# Patient Record
Sex: FEMALE | Race: WHITE | NOT HISPANIC OR LATINO | URBAN - METROPOLITAN AREA
[De-identification: names, ages, dates, MRNs, and addresses within clinical notes are randomized per-mention and may not be internally consistent; named-entity substitution may affect disease eponyms.]

---

## 2023-06-03 RX ORDER — OXYCODONE AND ACETAMINOPHEN 5; 325 MG/1; MG/1
1 TABLET ORAL EVERY 4 HOURS PRN
COMMUNITY

## 2023-06-03 RX ORDER — NITROFURANTOIN (MACROCRYSTALS) 100 MG/1
100 CAPSULE ORAL NIGHTLY
COMMUNITY

## 2023-06-03 RX ORDER — LANOLIN ALCOHOL/MO/W.PET/CERES
15000 CREAM (GRAM) TOPICAL DAILY
COMMUNITY

## 2023-06-03 RX ORDER — TRAMADOL HYDROCHLORIDE 50 MG/1
50 TABLET ORAL EVERY 6 HOURS PRN
COMMUNITY

## 2023-06-03 RX ORDER — BUTYROSPERMUM PARKII(SHEA BUTTER), SIMMONDSIA CHINENSIS (JOJOBA) SEED OIL, ALOE BARBADENSIS LEAF EXTRACT .01; 1; 3.5 G/100G; G/100G; G/100G
250 LIQUID TOPICAL NIGHTLY
COMMUNITY

## 2023-06-03 RX ORDER — FOLIC ACID 0.8 MG
1000 TABLET ORAL NIGHTLY
COMMUNITY

## 2023-06-03 RX ORDER — ESOMEPRAZOLE MAGNESIUM 40 MG/1
40 CAPSULE, DELAYED RELEASE ORAL DAILY
Status: ON HOLD | COMMUNITY
End: 2023-06-06 | Stop reason: CLARIF

## 2023-06-03 RX ORDER — LANOLIN ALCOHOL/MO/W.PET/CERES
2000 CREAM (GRAM) TOPICAL NIGHTLY
COMMUNITY

## 2023-06-03 RX ORDER — ERGOCALCIFEROL 1.25 MG/1
50000 CAPSULE ORAL
COMMUNITY

## 2023-06-06 ENCOUNTER — ANESTHESIA (OUTPATIENT)
Dept: SURGERY | Facility: HOSPITAL | Age: 53
End: 2023-06-06
Payer: COMMERCIAL

## 2023-06-06 ENCOUNTER — ANESTHESIA EVENT (OUTPATIENT)
Dept: SURGERY | Facility: HOSPITAL | Age: 53
End: 2023-06-06
Payer: COMMERCIAL

## 2023-06-06 ENCOUNTER — HOSPITAL ENCOUNTER (OUTPATIENT)
Facility: HOSPITAL | Age: 53
Discharge: HOME | End: 2023-06-06
Attending: UROLOGY | Admitting: UROLOGY
Payer: COMMERCIAL

## 2023-06-06 LAB
B-HCG UR QL: NEGATIVE
CTP QC/QA: YES

## 2023-06-06 PROCEDURE — D9220A PRA ANESTHESIA: Mod: ANES,,, | Performed by: ANESTHESIOLOGY

## 2023-06-06 PROCEDURE — 71000015 HC POSTOP RECOV 1ST HR: Performed by: UROLOGY

## 2023-06-06 PROCEDURE — 63600175 PHARM REV CODE 636 W HCPCS: Performed by: ANESTHESIOLOGY

## 2023-06-06 PROCEDURE — D9220A PRA ANESTHESIA: ICD-10-PCS | Mod: CRNA,,, | Performed by: NURSE ANESTHETIST, CERTIFIED REGISTERED

## 2023-06-06 PROCEDURE — 82962 GLUCOSE BLOOD TEST: CPT | Performed by: UROLOGY

## 2023-06-06 PROCEDURE — 36000707: Performed by: UROLOGY

## 2023-06-06 PROCEDURE — 37000009 HC ANESTHESIA EA ADD 15 MINS: Performed by: UROLOGY

## 2023-06-06 PROCEDURE — 25000003 PHARM REV CODE 250

## 2023-06-06 PROCEDURE — 25000003 PHARM REV CODE 250: Performed by: NURSE ANESTHETIST, CERTIFIED REGISTERED

## 2023-06-06 PROCEDURE — 71000033 HC RECOVERY, INTIAL HOUR: Performed by: UROLOGY

## 2023-06-06 PROCEDURE — D9220A PRA ANESTHESIA: Mod: CRNA,,, | Performed by: NURSE ANESTHETIST, CERTIFIED REGISTERED

## 2023-06-06 PROCEDURE — 63600175 PHARM REV CODE 636 W HCPCS: Performed by: NURSE ANESTHETIST, CERTIFIED REGISTERED

## 2023-06-06 PROCEDURE — 81025 URINE PREGNANCY TEST: CPT | Performed by: UROLOGY

## 2023-06-06 PROCEDURE — D9220A PRA ANESTHESIA: ICD-10-PCS | Mod: ANES,,, | Performed by: ANESTHESIOLOGY

## 2023-06-06 PROCEDURE — 63600175 PHARM REV CODE 636 W HCPCS

## 2023-06-06 PROCEDURE — 71000016 HC POSTOP RECOV ADDL HR: Performed by: UROLOGY

## 2023-06-06 PROCEDURE — 36000706: Performed by: UROLOGY

## 2023-06-06 PROCEDURE — 37000008 HC ANESTHESIA 1ST 15 MINUTES: Performed by: UROLOGY

## 2023-06-06 RX ORDER — FLUCONAZOLE 150 MG/1
150 TABLET ORAL
COMMUNITY
Start: 2023-05-26

## 2023-06-06 RX ORDER — MIDAZOLAM HYDROCHLORIDE 1 MG/ML
INJECTION INTRAMUSCULAR; INTRAVENOUS
Status: COMPLETED
Start: 2023-06-06 | End: 2023-06-06

## 2023-06-06 RX ORDER — SODIUM CHLORIDE, SODIUM GLUCONATE, SODIUM ACETATE, POTASSIUM CHLORIDE AND MAGNESIUM CHLORIDE 30; 37; 368; 526; 502 MG/100ML; MG/100ML; MG/100ML; MG/100ML; MG/100ML
INJECTION, SOLUTION INTRAVENOUS CONTINUOUS
Status: DISCONTINUED | OUTPATIENT
Start: 2023-06-06 | End: 2023-06-06 | Stop reason: HOSPADM

## 2023-06-06 RX ORDER — IBUPROFEN 800 MG/1
800 TABLET ORAL 3 TIMES DAILY
COMMUNITY
Start: 2023-05-29

## 2023-06-06 RX ORDER — LIDOCAINE HYDROCHLORIDE 10 MG/ML
1 INJECTION, SOLUTION EPIDURAL; INFILTRATION; INTRACAUDAL; PERINEURAL ONCE AS NEEDED
Status: DISCONTINUED | OUTPATIENT
Start: 2023-06-06 | End: 2023-06-06 | Stop reason: HOSPADM

## 2023-06-06 RX ORDER — ONDANSETRON HYDROCHLORIDE 2 MG/ML
INJECTION, SOLUTION INTRAMUSCULAR; INTRAVENOUS
Status: DISCONTINUED | OUTPATIENT
Start: 2023-06-06 | End: 2023-06-06

## 2023-06-06 RX ORDER — DEXAMETHASONE SODIUM PHOSPHATE 4 MG/ML
INJECTION, SOLUTION INTRA-ARTICULAR; INTRALESIONAL; INTRAMUSCULAR; INTRAVENOUS; SOFT TISSUE
Status: DISCONTINUED | OUTPATIENT
Start: 2023-06-06 | End: 2023-06-06

## 2023-06-06 RX ORDER — LIDOCAINE HYDROCHLORIDE 10 MG/ML
1 INJECTION, SOLUTION EPIDURAL; INFILTRATION; INTRACAUDAL; PERINEURAL ONCE
Status: DISCONTINUED | OUTPATIENT
Start: 2023-06-06 | End: 2023-06-06 | Stop reason: HOSPADM

## 2023-06-06 RX ORDER — ONDANSETRON 2 MG/ML
4 INJECTION INTRAMUSCULAR; INTRAVENOUS DAILY PRN
Status: DISCONTINUED | OUTPATIENT
Start: 2023-06-06 | End: 2023-06-06 | Stop reason: HOSPADM

## 2023-06-06 RX ORDER — FAMOTIDINE 20 MG/1
20 TABLET, FILM COATED ORAL ONCE
Status: CANCELLED | OUTPATIENT
Start: 2023-06-06

## 2023-06-06 RX ORDER — DIPHENHYDRAMINE HYDROCHLORIDE 50 MG/ML
25 INJECTION INTRAMUSCULAR; INTRAVENOUS EVERY 6 HOURS PRN
Status: DISCONTINUED | OUTPATIENT
Start: 2023-06-06 | End: 2023-06-06 | Stop reason: HOSPADM

## 2023-06-06 RX ORDER — LIDOCAINE HYDROCHLORIDE 10 MG/ML
INJECTION, SOLUTION EPIDURAL; INFILTRATION; INTRACAUDAL; PERINEURAL
Status: DISCONTINUED | OUTPATIENT
Start: 2023-06-06 | End: 2023-06-06

## 2023-06-06 RX ORDER — HYDROMORPHONE HYDROCHLORIDE 2 MG/ML
0.2 INJECTION, SOLUTION INTRAMUSCULAR; INTRAVENOUS; SUBCUTANEOUS EVERY 5 MIN PRN
Status: DISCONTINUED | OUTPATIENT
Start: 2023-06-06 | End: 2023-06-06 | Stop reason: HOSPADM

## 2023-06-06 RX ORDER — MIDAZOLAM HYDROCHLORIDE 1 MG/ML
2 INJECTION INTRAMUSCULAR; INTRAVENOUS
Status: ACTIVE | OUTPATIENT
Start: 2023-06-06 | End: 2023-06-06

## 2023-06-06 RX ORDER — PROCHLORPERAZINE EDISYLATE 5 MG/ML
5 INJECTION INTRAMUSCULAR; INTRAVENOUS EVERY 30 MIN PRN
Status: DISCONTINUED | OUTPATIENT
Start: 2023-06-06 | End: 2023-06-06 | Stop reason: HOSPADM

## 2023-06-06 RX ORDER — FAMOTIDINE 20 MG/1
TABLET, FILM COATED ORAL
Status: COMPLETED
Start: 2023-06-06 | End: 2023-06-06

## 2023-06-06 RX ORDER — PYRIDOXINE HCL (VITAMIN B6) 250 MG
250 TABLET ORAL
COMMUNITY
Start: 2023-05-26

## 2023-06-06 RX ORDER — TIRZEPATIDE 7.5 MG/.5ML
7.5 INJECTION, SOLUTION SUBCUTANEOUS
COMMUNITY
Start: 2023-05-29

## 2023-06-06 RX ORDER — METHOCARBAMOL 100 MG/ML
1000 INJECTION, SOLUTION INTRAMUSCULAR; INTRAVENOUS ONCE
Status: COMPLETED | OUTPATIENT
Start: 2023-06-06 | End: 2023-06-06

## 2023-06-06 RX ORDER — FAMOTIDINE 20 MG/1
20 TABLET, FILM COATED ORAL ONCE
Status: COMPLETED | OUTPATIENT
Start: 2023-06-06 | End: 2023-06-06

## 2023-06-06 RX ORDER — PANTOPRAZOLE SODIUM 40 MG/1
40 TABLET, DELAYED RELEASE ORAL
COMMUNITY
Start: 2023-05-23

## 2023-06-06 RX ORDER — PROPOFOL 10 MG/ML
VIAL (ML) INTRAVENOUS
Status: DISCONTINUED | OUTPATIENT
Start: 2023-06-06 | End: 2023-06-06

## 2023-06-06 RX ORDER — MEPERIDINE HYDROCHLORIDE 25 MG/ML
12.5 INJECTION INTRAMUSCULAR; INTRAVENOUS; SUBCUTANEOUS EVERY 10 MIN PRN
Status: DISCONTINUED | OUTPATIENT
Start: 2023-06-06 | End: 2023-06-06 | Stop reason: HOSPADM

## 2023-06-06 RX ORDER — ACETAMINOPHEN 500 MG
1000 TABLET ORAL
Status: COMPLETED | OUTPATIENT
Start: 2023-06-06 | End: 2023-06-06

## 2023-06-06 RX ORDER — ACETAMINOPHEN 500 MG
TABLET ORAL
Status: COMPLETED
Start: 2023-06-06 | End: 2023-06-06

## 2023-06-06 RX ORDER — CEFTRIAXONE 1 G/1
1 INJECTION, POWDER, FOR SOLUTION INTRAMUSCULAR; INTRAVENOUS
Status: DISCONTINUED | OUTPATIENT
Start: 2023-06-06 | End: 2023-06-06 | Stop reason: HOSPADM

## 2023-06-06 RX ORDER — SODIUM CHLORIDE 9 MG/ML
INJECTION, SOLUTION INTRAVENOUS CONTINUOUS
Status: DISCONTINUED | OUTPATIENT
Start: 2023-06-06 | End: 2023-06-06 | Stop reason: HOSPADM

## 2023-06-06 RX ADMIN — PROPOFOL 200 MG: 10 INJECTION, EMULSION INTRAVENOUS at 01:06

## 2023-06-06 RX ADMIN — MIDAZOLAM HYDROCHLORIDE 2 MG: 1 INJECTION INTRAMUSCULAR; INTRAVENOUS at 01:06

## 2023-06-06 RX ADMIN — SODIUM CHLORIDE, POTASSIUM CHLORIDE, SODIUM LACTATE AND CALCIUM CHLORIDE: 600; 310; 30; 20 INJECTION, SOLUTION INTRAVENOUS at 02:06

## 2023-06-06 RX ADMIN — FAMOTIDINE 20 MG: 20 TABLET, FILM COATED ORAL at 12:06

## 2023-06-06 RX ADMIN — MIDAZOLAM HYDROCHLORIDE 2 MG: 1 INJECTION, SOLUTION INTRAMUSCULAR; INTRAVENOUS at 01:06

## 2023-06-06 RX ADMIN — DEXAMETHASONE SODIUM PHOSPHATE 4 MG: 4 INJECTION, SOLUTION INTRA-ARTICULAR; INTRALESIONAL; INTRAMUSCULAR; INTRAVENOUS; SOFT TISSUE at 01:06

## 2023-06-06 RX ADMIN — ACETAMINOPHEN 1000 MG: 500 TABLET, FILM COATED ORAL at 12:06

## 2023-06-06 RX ADMIN — SODIUM CHLORIDE, POTASSIUM CHLORIDE, SODIUM LACTATE AND CALCIUM CHLORIDE: 600; 310; 30; 20 INJECTION, SOLUTION INTRAVENOUS at 01:06

## 2023-06-06 RX ADMIN — LIDOCAINE HYDROCHLORIDE 20 MG: 10 INJECTION, SOLUTION EPIDURAL; INFILTRATION; INTRACAUDAL; PERINEURAL at 01:06

## 2023-06-06 RX ADMIN — Medication 1000 MG: at 12:06

## 2023-06-06 RX ADMIN — ONDANSETRON 4 MG: 2 INJECTION INTRAMUSCULAR; INTRAVENOUS at 02:06

## 2023-06-06 RX ADMIN — METHOCARBAMOL 1000 MG: 100 INJECTION, SOLUTION INTRAMUSCULAR; INTRAVENOUS at 02:06

## 2023-06-06 NOTE — OP NOTE
[facility] - 8th Floor Med Surg  General Surgery  Operative Note    SUMMARY     Date of Procedure: 6/6/2023      Surgeon(s) and Role:     * Terrance Delatorre MD - Primary    Assisting Surgeon: None    Pre-Operative Diagnosis: Left UPJ stone    Post-Operative Diagnosis: Left renal pelvic stone  8mm    Procedure: left eswl 2500 shocks; cysto stent removal    Anesthesia: general    Estimated Blood Loss (EBL): none    Description of Technical Procedures:  Pt was placed on the lithotrypsy unit. The stone was now in the kidney. It was delivered 2500 shocks and broke up well.  The she was placed in lithotomy and a cystoscope was placed. The stent  was grasped and removed . She was awoken and borght to recovery

## 2023-06-06 NOTE — CARE UPDATE
Received patient from the OR, she is asleep, oral airway in place, chin lift not necessary at present-nurse remains at bedside with constant assessment/observation, respirations full-regular-deep-clear,hob up 30 degrees.

## 2023-06-06 NOTE — TRANSFER OF CARE
Anesthesia Transfer of Care Note    Patient: Barbara Almaraz    Procedure(s) Performed: Procedure(s) (LRB):  LITHOTRIPSY, ESWL  Left (Left)  CYSTOSCOPY, WITH URETERAL STENT REMOVAL (Left)    Patient location: PACU    Anesthesia Type: general    Transport from OR: Transported from OR on room air with adequate spontaneous ventilation    Post pain: adequate analgesia    Post assessment: no apparent anesthetic complications and tolerated procedure well    Post vital signs: stable    Level of consciousness: sedated    Nausea/Vomiting: no nausea/vomiting    Complications: none    Transfer of care protocol was followed

## 2023-06-06 NOTE — ANESTHESIA PROCEDURE NOTES
Intubation    Date/Time: 6/6/2023 1:28 PM  Performed by: Snehal Pimentel CRNA  Authorized by: Parish Staples MD     Intubation:     Induction:  Intravenous    Intubated:  Postinduction    Mask Ventilation:  Easy mask    Attempts:  1    Attempted By:  CRNA    Difficult Airway Encountered?: No      Airway Device:  Supraglottic airway/LMA    Airway Device Size:  4.0    Style/Cuff Inflation:  Cuffed (inflated to minimal occlusive pressure)    Inflation Amount (mL):  18    Placement Verified By:  Capnometry    Complicating Factors:  None    Findings Post-Intubation:  BS equal bilateral

## 2023-06-06 NOTE — ANESTHESIA PREPROCEDURE EVALUATION
06/06/2023  Barbara Almaraz is a 52 y.o., female.    Pre-op Diagnosis: Calculus of kidney [N20.0]    Procedure(s):  LITHOTRIPSY, ESWL  Left  CYSTOSCOPY, WITH URETERAL STENT REMOVAL     Review of patient's allergies indicates:   Allergen Reactions    Opioids - morphine analogues Other (See Comments)     Patient loss speech when given after surgery    Metformin      Kidney infections    Penicillins Other (See Comments)     Massive yeast infections    Ciprofloxacin Palpitations       Current Outpatient Medications   Medication Instructions    cyanocobalamin (VITAMIN B-12) 15,000 mcg, Oral, Daily    ergocalciferol (VITAMIN D2) 50,000 Units, Oral, Every Cesario    esomeprazole (NEXIUM) 40 mg, Oral, Daily    folic acid (FOLVITE) 1,000 mcg, Oral, Nightly    l-methylfolate-b2-b6-b12 (CEREFOLIN) 6-5-50-1 mg Tab 1 tablet, Oral, Daily    magnesium oxide (MAG-OX) 2,000 mg, Oral, Nightly    nitrofurantoin (MACRODANTIN) 100 mg, Oral, Nightly    oxyCODONE-acetaminophen (PERCOCET) 5-325 mg per tablet 1 tablet, Oral, Every 4 hours PRN    Saccharomyces boulardii (FLORASTOR) 250 mg, Oral, Nightly    traMADoL (ULTRAM) 50 mg, Oral, Every 6 hours PRN     NE FRAGMENT KIDNEY STONE/ ESWL [66280] (LITHOTRIPSY, ESWL  *    Past Medical History:   Diagnosis Date    DM (diabetes mellitus)     Heartburn     History of esophageal stricture     Sleep apnea, unspecified     Unspecified hemorrhoids    Denies Esophageal Stricture, Vagal Reaction after blood draw      Past Surgical History:   Procedure Laterality Date    CHOLECYSTECTOMY, LAPAROSCOPIC, WITH COMMON BILE DUCT EXPLORATION      TUMOR REMOVAL Left     left leg at age 9     5/21/2023 ECG - ST rate 101 LAD late transition no acute ST T changes    5/24/2023 LABS  H/H 38.9/13.0<235 PLT  141/4.0/105/27//11/0.8<91 AST 13 ALT 23      Pre-op Assessment    I have reviewed  the Patient Summary Reports.    I have reviewed the NPO Status.   I have reviewed the Medications.     Review of Systems  Anesthesia Hx:  No problems with previous Anesthesia  Denies Family Hx of Anesthesia complications.   Denies Personal Hx of Anesthesia complications.   Social:  Non-Smoker    Cardiovascular:   Exercise tolerance: good  Denies Angina.  Denies Orthopnea.  Denies PND.  Denies PINO.  Functional Capacity good / => 4 METS    Pulmonary:   Sleep Apnea    Hepatic/GI:   GERD  Esophageal / Stomach Disorders    Musculoskeletal:  Musculoskeletal Normal    Neurological:   Denies TIA. Denies CVA.    Endocrine:   Diabetes    Psych:  Psychiatric Normal           Physical Exam  General: Well nourished, Alert and Oriented    Airway:  Mallampati: III   Mouth Opening: Normal  TM Distance: Normal  Tongue: Normal  Neck ROM: Normal ROM    Dental:  Intact    Chest/Lungs:  Clear to auscultation    Heart:  Rate: Normal  Rhythm: Regular Rhythm  No pretibial edema  No carotid bruits      Anesthesia Plan  Type of Anesthesia, risks & benefits discussed:    Anesthesia Type: Gen Supraglottic Airway  Intra-op Monitoring Plan: Standard ASA Monitors  Post Op Pain Control Plan: multimodal analgesia  Induction:  IV  Airway Plan: Direct, Post-Induction  Informed Consent: Informed consent signed with the Patient and all parties understand the risks and agree with anesthesia plan.  All questions answered. Patient consented to blood products? No  ASA Score: 3  Day of Surgery Review of History & Physical: H&P Update referred to the surgeon/provider.    Ready For Surgery From Anesthesia Perspective.     .

## 2023-06-07 VITALS
BODY MASS INDEX: 32.74 KG/M2 | OXYGEN SATURATION: 100 % | RESPIRATION RATE: 20 BRPM | WEIGHT: 191.81 LBS | DIASTOLIC BLOOD PRESSURE: 89 MMHG | SYSTOLIC BLOOD PRESSURE: 138 MMHG | TEMPERATURE: 98 F | HEIGHT: 64 IN | HEART RATE: 80 BPM

## 2023-06-07 LAB — POCT GLUCOSE: 64 MG/DL (ref 70–110)

## 2023-06-07 NOTE — ANESTHESIA POSTPROCEDURE EVALUATION
Anesthesia Post Evaluation    Patient: Barbara Almaraz    Procedure(s) Performed: Procedure(s) (LRB):  LITHOTRIPSY, ESWL  Left (Left)  CYSTOSCOPY, WITH URETERAL STENT REMOVAL (Left)    Final Anesthesia Type: general      Patient location during evaluation: PACU  Patient participation: Yes- Able to Participate  Level of consciousness: awake and alert and oriented  Post-procedure vital signs: reviewed and stable  Pain management: adequate  Airway patency: patent    PONV status at discharge: No PONV  Anesthetic complications: no      Cardiovascular status: hemodynamically stable  Respiratory status: unassisted  Hydration status: euvolemic  Follow-up not needed.          Vitals Value Taken Time   /84 06/06/23 1558   Temp 36.6 °C (97.9 °F) 06/06/23 1450   Pulse 66 06/06/23 1558   Resp 20 06/06/23 1458   SpO2 100 % 06/06/23 1558         Event Time   Out of Recovery 14:53:00         Pain/Florin Score: Pain Rating Prior to Med Admin: 0 (6/6/2023 12:29 PM)  Florin Score: 10 (6/6/2023  3:00 PM)  Modified Florin Score: 20 (6/6/2023  5:07 PM)

## 2023-06-08 LAB — POCT GLUCOSE: 75 MG/DL (ref 70–110)

## 2023-06-20 NOTE — DISCHARGE SUMMARY
[facility] - Periop Services  Discharge Note  Short Stay    Procedure(s) (LRB):  LITHOTRIPSY, ESWL  Left (Left)  CYSTOSCOPY, WITH URETERAL STENT REMOVAL (Left)      OUTCOME: Condition has improved and patient is now ready for discharge.    DISPOSITION: Home or Self Care    FINAL DIAGNOSIS:  <principal problem not specified>    FOLLOWUP: In clinic    DISCHARGE INSTRUCTIONS:  No discharge procedures on file.     TIME SPENT ON DISCHARGE: 10 minutes

## (undated) DEVICE — GLOVE PROTEXIS LTX MICRO 6.5

## (undated) DEVICE — SOL IRR WATER STRL 3000 ML

## (undated) DEVICE — BAG DRAIN UROLOGY AND HOSE

## (undated) DEVICE — JELLY SURGILUBE LUBE TUBE 2OZ

## (undated) DEVICE — SUPPORT ULNA NERVE PROTECTOR

## (undated) DEVICE — Device

## (undated) DEVICE — GAUZE SPONGE 4X4 12PLY